# Patient Record
(demographics unavailable — no encounter records)

---

## 2025-07-28 NOTE — ASSESSMENT
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  Hyperthyroidism: off medications, TSH check  WPW: Resolved- used to see Dr. Lisker in past  Mild hyperkalemia: will repeat labs, BMP ordered   Right foot with bony outgrowth: unclear chronicity, x ray ordered to assess further   HCM: Believes Tdap UTD Colonoscopy July 2021 with Dr. Uriostegui, repeat 2031 Labs done prior to appointment, reviewed  Follow up for next CPE, sooner if needed   Lynnette Meza MD

## 2025-07-28 NOTE — PHYSICAL EXAM
[Normal Oropharynx] : the oropharynx was normal [Normal TMs] : both tympanic membranes were normal [No Lymphadenopathy] : no lymphadenopathy [No Edema] : there was no peripheral edema [Soft] : abdomen soft [Non Tender] : non-tender [Non-distended] : non-distended [Normal Gait] : normal gait [Normal] : affect was normal and insight and judgment were intact [de-identified] : right foot with bony outgrowth above heel

## 2025-07-28 NOTE — HISTORY OF PRESENT ILLNESS
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   Hyperthyroidism: off medications, TSH check    WPW: Resolved- used to see Dr. Lisker in past    Mild hyperkalemia: will repeat labs, BMP ordered   Right foot with bony outgrowth: unclear chronicity  HCM:   Believes Tdap UTD  Colonoscopy July 2021 with Dr. Uriostegui, repeat 2031   Labs done prior to appointment, reviewed   SH: allergy/immunology physician

## 2025-07-28 NOTE — HEALTH RISK ASSESSMENT
[No] : In the past 12 months have you used drugs other than those required for medical reasons? No [0] : 2) Feeling down, depressed, or hopeless: Not at all (0) [PHQ-2 Negative - No further assessment needed] : PHQ-2 Negative - No further assessment needed [Never] : Never [[id number]] : 0